# Patient Record
Sex: MALE | Race: WHITE
[De-identification: names, ages, dates, MRNs, and addresses within clinical notes are randomized per-mention and may not be internally consistent; named-entity substitution may affect disease eponyms.]

---

## 2020-02-17 ENCOUNTER — HOSPITAL ENCOUNTER (EMERGENCY)
Dept: HOSPITAL 25 - ED | Age: 65
Discharge: HOME | End: 2020-02-17
Payer: COMMERCIAL

## 2020-02-17 ENCOUNTER — HOSPITAL ENCOUNTER (EMERGENCY)
Dept: HOSPITAL 25 - UCEAST | Age: 65
Discharge: HOME HEALTH | End: 2020-02-17
Payer: COMMERCIAL

## 2020-02-17 VITALS — DIASTOLIC BLOOD PRESSURE: 83 MMHG | SYSTOLIC BLOOD PRESSURE: 172 MMHG

## 2020-02-17 VITALS — SYSTOLIC BLOOD PRESSURE: 169 MMHG | DIASTOLIC BLOOD PRESSURE: 85 MMHG

## 2020-02-17 DIAGNOSIS — C67.9: Primary | ICD-10-CM

## 2020-02-17 DIAGNOSIS — Z87.891: ICD-10-CM

## 2020-02-17 DIAGNOSIS — Z95.5: ICD-10-CM

## 2020-02-17 DIAGNOSIS — H61.23: ICD-10-CM

## 2020-02-17 DIAGNOSIS — R31.0: Primary | ICD-10-CM

## 2020-02-17 LAB
ALBUMIN SERPL BCG-MCNC: 4.8 G/DL (ref 3.2–5.2)
ALBUMIN/GLOB SERPL: 1.5 {RATIO} (ref 1–3)
ALP SERPL-CCNC: 35 U/L (ref 34–104)
ALT SERPL W P-5'-P-CCNC: 12 U/L (ref 7–52)
ANION GAP SERPL CALC-SCNC: 9 MMOL/L (ref 2–11)
AST SERPL-CCNC: 16 U/L (ref 13–39)
BASOPHILS # BLD AUTO: 0.1 10^3/UL (ref 0–0.2)
BUN SERPL-MCNC: 23 MG/DL (ref 6–24)
BUN/CREAT SERPL: 13.7 (ref 8–20)
CALCIUM SERPL-MCNC: 7.7 MG/DL (ref 8.6–10.3)
CHLORIDE SERPL-SCNC: 101 MMOL/L (ref 101–111)
EOSINOPHIL # BLD AUTO: 0.2 10^3/UL (ref 0–0.6)
GLOBULIN SER CALC-MCNC: 3.2 G/DL (ref 2–4)
GLUCOSE SERPL-MCNC: 93 MG/DL (ref 70–100)
HCO3 SERPL-SCNC: 27 MMOL/L (ref 22–32)
HCT VFR BLD AUTO: 39 % (ref 42–52)
HGB BLD-MCNC: 13.6 G/DL (ref 14–18)
LYMPHOCYTES # BLD AUTO: 1.6 10^3/UL (ref 1–4.8)
MCH RBC QN AUTO: 30 PG (ref 27–31)
MCHC RBC AUTO-ENTMCNC: 35 G/DL (ref 31–36)
MCV RBC AUTO: 86 FL (ref 80–94)
MONOCYTES # BLD AUTO: 0.6 10^3/UL (ref 0–0.8)
NEUTROPHILS # BLD AUTO: 5.3 10^3/UL (ref 1.5–7.7)
NRBC # BLD AUTO: 0 10^3/UL
NRBC BLD QL AUTO: 0
PLATELET # BLD AUTO: 311 10^3/UL (ref 150–450)
POTASSIUM SERPL-SCNC: 3.7 MMOL/L (ref 3.5–5)
PROT SERPL-MCNC: 8 G/DL (ref 6.4–8.9)
RBC # BLD AUTO: 4.5 10^6 /UL (ref 4.18–5.48)
RBC UR QL AUTO: (no result)
SODIUM SERPL-SCNC: 137 MMOL/L (ref 135–145)
WBC # BLD AUTO: 7.8 10^3/UL (ref 3.5–10.8)
WBC UR QL AUTO: (no result)

## 2020-02-17 PROCEDURE — 81015 MICROSCOPIC EXAM OF URINE: CPT

## 2020-02-17 PROCEDURE — 74178 CT ABD&PLV WO CNTR FLWD CNTR: CPT

## 2020-02-17 PROCEDURE — 81003 URINALYSIS AUTO W/O SCOPE: CPT

## 2020-02-17 PROCEDURE — 85025 COMPLETE CBC W/AUTO DIFF WBC: CPT

## 2020-02-17 PROCEDURE — 87086 URINE CULTURE/COLONY COUNT: CPT

## 2020-02-17 PROCEDURE — 99203 OFFICE O/P NEW LOW 30 MIN: CPT

## 2020-02-17 PROCEDURE — 80053 COMPREHEN METABOLIC PANEL: CPT

## 2020-02-17 PROCEDURE — 99283 EMERGENCY DEPT VISIT LOW MDM: CPT

## 2020-02-17 PROCEDURE — 76377 3D RENDER W/INTRP POSTPROCES: CPT

## 2020-02-17 PROCEDURE — G0463 HOSPITAL OUTPT CLINIC VISIT: HCPCS

## 2020-02-17 PROCEDURE — 36415 COLL VENOUS BLD VENIPUNCTURE: CPT

## 2020-02-17 NOTE — ED
GI/ HPI





- HPI Summary


HPI Summary: 


64-year-old male presents with intermittent hematuria for the past month.  He 

denies any pain.  No flank pain.  No dysuria.  He denies any urgency frequency 

or hesitancy.  He states occasionally he passes clots.  He states he had normal 

urine a couple days ago but today it is bright red.  He was a former smoker for 

40 years.  He does not currently have a doctor.  He has no medical conditions.  

he is not on blood thinners.  No history kidney stones. 





- History of Current Complaint


Chief Complaint: EDUrogenitalProblems


Time Seen by Provider: 02/17/20 20:38


Stated Complaint: URINATING BLOOD PER PT


Pain Intensity: 0





- Allergy/Home Medications


Allergies/Adverse Reactions: 


 Allergies











Allergy/AdvReac Type Severity Reaction Status Date / Time


 


No Known Allergies Allergy   Verified 02/17/20 20:28














PMH/Surg Hx/FS Hx/Imm Hx


Endocrine/Hematology History: 


   Denies: Hx Diabetes


Cardiovascular History: 


   Denies: Hx Hypertension, Hx Pacemaker/ICD


 History: 


   Denies: Hx Renal Disease


Sensory History: 


   Denies: Hx Hearing Aid


Psychiatric History: 


   Denies: Hx Panic Disorder





- Surgical History


Surgery Procedure, Year, and Place: 02/10 - CARDIAC STENTS - PT HAS CARDS - 

WILL BRING AT TIME OF APT - MULTI LINK MINI VISION -CONDITIONAL 5 FOR UP TO 3T.

  05/10 - Rt ANKLE -PLATES/SCREWS -





- Immunization History


Immunizations Up to Date: No


Infectious Disease History: No


Infectious Disease History: 


   Denies: Traveled Outside the [place] in Last 30 Days





- Family History


Known Family History: Positive: Non-Contributory





- Social History


Alcohol Use: None


Substance Use Type: Reports: None


Smoking Status (MU): Former Smoker





Review of Systems


Negative: Fever


Negative: Chest Pain


Negative: Shortness Of Breath


Positive: hematuria.  Negative: flank pain


All Other Systems Reviewed And Are Negative: Yes





Physical Exam


Triage Information Reviewed: Yes


Vital Signs On Initial Exam: 


 Initial Vitals











Temp Pulse Resp BP Pulse Ox


 


 97.5 F   60   15   174/86   100 


 


 02/17/20 20:26  02/17/20 20:26  02/17/20 20:26  02/17/20 20:26  02/17/20 20:26











Vital Signs Reviewed: Yes


Appearance: Positive: Well-Appearing


Skin: Positive: Warm, Dry


Head/Face: Positive: Normal Head/Face Inspection


Eyes: Positive: Normal, Conjunctiva Clear


ENT: Positive: Pharynx normal


Respiratory/Lung Sounds: Positive: Clear to Auscultation, Breath Sounds Present


Cardiovascular: Positive: Normal, RRR


Abdomen Description: Positive: Nontender, Soft.  Negative: CVA Tenderness (R), 

CVA Tenderness (L)


Bowel Sounds: Positive: Present


Musculoskeletal: Positive: Normal


Neurological: Positive: Normal


Psychiatric: Positive: Normal





Procedures





- Sedation


Patient Received Moderate/Deep Sedation with Procedure: No





Diagnostics





- Vital Signs


 Vital Signs











  Temp Pulse Resp BP Pulse Ox


 


 02/17/20 20:26  97.5 F  60  15  174/86  100














- Laboratory


Lab Results: 


 Lab Results











  02/17/20 02/17/20 02/17/20 Range/Units





  20:45 20:49 20:49 


 


WBC   7.8   (3.5-10.8)  10^3/uL


 


RBC   4.50   (4.18-5.48)  10^6 /uL


 


Hgb   13.6 L   (14.0-18.0)  g/dL


 


Hct   39 L   (42-52)  %


 


MCV   86   (80-94)  fL


 


MCH   30   (27-31)  pg


 


MCHC   35   (31-36)  g/dL


 


RDW   14   (10-15)  %


 


Plt Count   311   (150-450)  10^3/uL


 


MPV   6.9 L   (7.4-10.4)  fL


 


Neut % (Auto)   68.7   %


 


Lymph % (Auto)   21.0   %


 


Mono % (Auto)   7.3   %


 


Eos % (Auto)   2.0   %


 


Baso % (Auto)   1.0   %


 


Absolute Neuts (auto)   5.3   (1.5-7.7)  10^3/ul


 


Absolute Lymphs (auto)   1.6   (1.0-4.8)  10^3/ul


 


Absolute Monos (auto)   0.6   (0-0.8)  10^3/ul


 


Absolute Eos (auto)   0.2   (0-0.6)  10^3/ul


 


Absolute Basos (auto)   0.1   (0-0.2)  10^3/ul


 


Absolute Nucleated RBC   0.0   10^3/ul


 


Nucleated RBC %   0.0   


 


Sodium    137  (135-145)  mmol/L


 


Potassium    3.7  (3.5-5.0)  mmol/L


 


Chloride    101  (101-111)  mmol/L


 


Carbon Dioxide    27  (22-32)  mmol/L


 


Anion Gap    9  (2-11)  mmol/L


 


BUN    23  (6-24)  mg/dL


 


Creatinine    1.68 H  (0.67-1.17)  mg/dL


 


Est GFR ( Amer)    50.0  (>60)  


 


Est GFR (Non-Af Amer)    41.3  (>60)  


 


BUN/Creatinine Ratio    13.7  (8-20)  


 


Glucose    93  ()  mg/dL


 


Calcium    7.7 L  (8.6-10.3)  mg/dL


 


Total Bilirubin    0.40  (0.2-1.0)  mg/dL


 


AST    16  (13-39)  U/L


 


ALT    12  (7-52)  U/L


 


Alkaline Phosphatase    35  ()  U/L


 


Total Protein    8.0  (6.4-8.9)  g/dL


 


Albumin    4.8  (3.2-5.2)  g/dL


 


Globulin    3.2  (2-4)  g/dL


 


Albumin/Globulin Ratio    1.5  (1-3)  


 


Urine Color  Red A    


 


Urine Appearance  Cloudy    


 


Urine pH  Np    


 


Ur Specific Gravity  1.009 L    (1.010-1.030)  


 


Urine Protein  Np    


 


Urine Ketones  Np    


 


Urine Blood  Np    


 


Urine Nitrate  Np    


 


Urine Bilirubin  Np    


 


Urine Urobilinogen  Np    


 


Ur Leukocyte Esterase  Np    


 


Urine WBC (Auto)  Trace(0-5/hpf)    (Absent)  


 


Urine RBC (Auto)  3+(>10/hpf) A    (Absent)  


 


Urine Bacteria  Absent    (Absent)  


 


Urine Glucose  Np    











Result Diagrams: 


 02/17/20 20:49





 02/17/20 20:49


Lab Statement: Any lab studies that have been ordered have been reviewed, and 

results considered in the medical decision making process.





- Ultrasound


  ** No standard instances


Ultrasound Interpretation Completed By: Radiologist


Summary of Ultrasound Findings: IMPRESSION: Urothelial carcinoma posterior left 

bladder obstructing the left UVJ. No clear extra bladder extension (T3 A./B.). 

No abnormal lymph nodes (N0). No distal abdominal pelvic metastases (MX). 

Recommend cystoscopy and biopsy for further characterization along with PET/CT 

for full staging.





GIGU Course/Dx





- Course


Course Of Treatment: 64-year-old male presents with intermittent hematuria for 

the past month.  He denies any pain.  No flank pain.  No dysuria.  He denies 

any urgency frequency or hesitancy.  He states occasionally he passes clots.  

He states he had normal urine a couple days ago but today it is bright red.  He 

was a former smoker for 40 years.  He does not currently have a doctor.  He has 

no medical conditions.  he is not on blood thinners.  No history kidney stones.

  On exam nontender abdomen and flank.  Urine shows hematuria.  wbc normal.  

Creatinine 1.68.  CT urogram shows carcinoma left bladder. spoke with dr gale 

who says can follow up in office. patient understand and agrees with plan.





- Diagnoses


Differential Diagnoses - Male: Ureteral Calculi, Urinary Tract Infection


Provider Diagnoses: 


 Bladder cancer








Discharge ED





- Sign-Out/Discharge


Documenting (check all that apply): Patient Departure





- Discharge Plan


Condition: Good


Disposition: HOME


Patient Education Materials:  Bladder Cancer (DC)


Referrals: 


No Primary Care Phys,NOPCP [Primary Care Provider] - 


Bruce Gale MD [Medical Doctor] - 


Additional Instructions: 


call urology tomorrow for follow up


Return to ED if develop any new or worsening symptoms





- Billing Disposition and Condition


Condition: GOOD


Disposition: Home

## 2020-02-17 NOTE — UC
Complaint Male HPI





- HPI Summary


HPI Summary: 





The patient is a 64-year-old male who has had intermittent painless gross 

hematuria times one month.  He has had no fever or chills he denies any 

unintentional weight loss.  He has no painful urination or flank pain.  He 

complains that he has not been able to here for the past few days.  He denies 

any headache.





- History of Current Complaint


Chief Complaint: UCGU


Stated Complaint: PROBLEM WITH URINATION


Time Seen by Provider: 02/17/20 19:25


Hx Obtained From: Patient


Onset/Duration: Gradual Onset, Lasting Weeks


Timing: Intermittent, Lasting Seconds


Severity Initially: Mild


Severity Currently: Moderate


Pain Intensity: 0


Pain Scale Used: 0-10 Numeric


Location: None


Associated Signs And Symptoms: Positive: Hematuria





- Allergies/Home Medications


Allergies/Adverse Reactions: 


 Allergies











Allergy/AdvReac Type Severity Reaction Status Date / Time


 


No Known Allergies Allergy   Verified 02/17/20 19:21











Home Medications: 


 Home Medications





Pseudoephedrine TAB* [Sudafed TAB*] 60 mg PO BID 02/17/20 [History Confirmed 02/ 17/20]











PMH/Surg Hx/FS Hx/Imm Hx


Previously Healthy: Yes


Cardiovascular History: Cardiac Disease





- Surgical History


Surgical History: Yes


Surgery Procedure, Year, and Place: 02/10 - CARDIAC STENTS - PT HAS CARDS - 

WILL BRING AT TIME OF APT - MULTI LINK MINI VISION -CONDITIONAL 5 FOR UP TO 3T.

  05/10 - Rt ANKLE -PLATES/SCREWS -





- Social History


Alcohol Use: None


Substance Use Type: None


Smoking Status (MU): Former Smoker





Review of Systems


All Other Systems Reviewed And Are Negative: Yes


Constitutional: Positive: Negative


Skin: Positive: Negative


Eyes: Positive: Negative


ENT: Positive: Other - decreased hearing


Respiratory: Positive: Negative


Cardiovascular: Positive: Negative


Gastrointestinal: Positive: Negative


Genitourinary: Positive: Hematuria


Motor: Positive: Negative


Neurovascular: Positive: Negative


Musculoskeletal: Positive: Negative


Neurological/Mental Status: Positive: Negative


Psychological: Positive: Negative





Physical Exam


Triage Information Reviewed: Yes


Appearance: Well-Appearing, No Pain Distress, Well-Nourished


Vital Signs: 


 Initial Vital Signs











Temp  98.4 F   02/17/20 19:15


 


Pulse  72   02/17/20 19:15


 


Resp  16   02/17/20 19:15


 


BP  172/83   02/17/20 19:15


 


Pulse Ox  99   02/17/20 19:15











Vital Signs Reviewed: Yes


Eyes: Positive: Conjunctiva Clear


ENT: Positive: Uvula midline.  Negative: Hearing grossly normal, Pharyngeal 

erythema, Nasal congestion, Tonsillar swelling, Tonsillar exudate, Trismus, 

Muffled voice, Hoarse voice


Dental: Negative: Gross Decay/Caries @


Neck: Positive: Supple, Nontender


Respiratory: Positive: Lungs clear, Normal breath sounds, No respiratory 

distress, No accessory muscle use


Cardiovascular: Positive: RRR, No Murmur


Abdomen Description: Positive: Nontender, No Organomegaly - In, Soft.  Negative

: CVA Tenderness (R), CVA Tenderness (L)


Bowel Sounds: Positive: Present


Neurological: Positive: Alert


Psychological Exam: Normal


Skin Exam: Normal





Diagnostics





- Laboratory


Lab Results: 





urine: gross hematuria





 Complaint Male Course/Dx





- Course


Course Of Treatment: 





I informed the patient at the primary concern as a cause for his hematuria is 

cancer.  I informed him that we could not do the evaluation here.  He states he 

does not have a primary care physician so I have suggested he go to the 

emergency room for evaluation.  While he is here we will flush his ears.





- Differential Dx/Diagnosis


Provider Diagnosis: 


 Gross hematuria, Cerumen impaction








Discharge ED





- Sign-Out/Discharge


Documenting (check all that apply): Patient Departure


All imaging exams completed and their final reports reviewed: No Studies





- Discharge Plan


Condition: Stable


Disposition: HOME-RECOMMEND TO ED


Patient Education Materials:  Hematuria (ED)


Additional Instructions: 


I suggest you go to the ER to have your GROSS HEMATURIA evaluated





Since you don't have a primary care provider to do an expedited out patient-

evaluation I suspect this work up would be best started in the ER 








- Billing Disposition and Condition


Condition: STABLE


Disposition: Home-Recommend to ED

## 2020-03-18 NOTE — HP
HISTORY AND PHYSICAL:

 

DATE OF PLANNED ADMISSION AND SURGERY:  03/20/20

 

HISTORY OF PRESENT ILLNESS:  Mr. Bird is a 64-year-old white male who is 
admitted with a large bladder tumor and left ureteral obstruction for cystoscopy
, transurethral resection of bladder tumor, left retrograde pyelography, and 
insertion of a left ureteral stent.

 

Please refer to the detailed history and physical by Dr. Ceron dated 03/13/20.

 

Mr. Bird is a 64-year-old white male who presented to the emergency room about 
1 month ago because of gross painless hematuria.  He had a CT urogram in the 
emergency room which showed moderate left hydroureteronephrosis and a rather 
large mass occupying the left base and the left lateral  bladder wall.  There 
was no evidence of extension of the tumor outside the bladder wall and no 
abnormal filling defects in the ureters or in the collecting systems.  He did 
not have any lymphadenopathy or evidence of metastatic disease.

 

The patient had a cystoscopy in the office which confirmed the presence of a 
high- grade looking transitional cell carcinoma occupying the left base of the 
bladder, the left trigone, and the left lateral wall.  The left ureteral 
orifice could not be visualized due to the overlying tumor.  Evaluation of the 
rest of the bladder wall was suboptimal due to the presence of blood in the 
bladder.

 

Past  history is otherwise negative.  He has nocturia once or twice, the 
frequency every 2 hours.  He reports having adequate urinary stream and no 
feeling of incomplete bladder emptying.  He denies any past history of urinary 
tract infections or gross hematuria.

 

PAST MEDICAL HISTORY AND SYSTEM REVIEW:  Summarized in Dr. Ceron's note. The 
patient has history of an old myocardial infarction, but has been asymptomatic 
cardiac wise.  He was worked up by Dr. Ceron including a nuclear stress test, 
which showed low risk disease.

 

CURRENT MEDICATIONS:

1.  Amlodipine 5 mg daily.

2.  Atorvastatin 80 mg daily.

 

ALLERGIES:  He denies any allergies to medications.

 

FAMILY HISTORY:  Negative.

 

PERSONAL HISTORY:  The patient was a smoker of 2 packs per day for almost 30 
years and stopped 10 years ago.  He denies shortness of breath or difficulty 
breathing. He denies any alcohol or drug use.  He denies any mental health 
history.

 

                               PHYSICAL EXAMINATION

 

GENERAL:  Pleasant white male, who looks older than his age.

 

VITAL SIGNS:  Blood pressure 150/70, pulse of 60.

 

LUNGS:  Clear.

 

HEART:  Regular and rhythmic.  No murmurs.

 

ABDOMEN:  Soft.  No masses, no tenderness, and no CVA tenderness.

 

EXTERNAL GENITALIA:  Normal.

 

RECTAL EXAM:  Shows a moderately enlarged, but non-suspicious prostate.

 

 IMPRESSION:

1.  High-grade looking bulky transitional cell carcinoma involving the left 
base and the left lateral bladder walls with associated left 
hydroureteronephrosis strongly suggestive of muscle invasive disease.

2. Except for the left hydronephrosis, the upper tracts look normal on the CT 
urogram, in particular no abnormal filling defects in the collecting systems or 
the ureters.

3.  Coronary artery disease, status post stent insertion 10 years ago.  
Cardiology consultation and clearance obtained.

 

PLAN:  Plan is for cystoscopy, transurethral resection of the bladder tumor, 
and insertion of left ureteral stent.

 

The patient understands he will require additional treatment depending upon the 
pathology. Some of the potential complications of the procedure include 
infection, hematuria, possible bladder perforation, and possible need for a 
left nephrostomy tube.

 

All his questions were answered.

 

 

 

[id number]/CPS #: [id number]

GREGOR

## 2020-03-20 ENCOUNTER — HOSPITAL ENCOUNTER (OUTPATIENT)
Dept: HOSPITAL 25 - OR | Age: 65
Discharge: HOME | End: 2020-03-20
Attending: UROLOGY
Payer: COMMERCIAL

## 2020-03-20 VITALS — DIASTOLIC BLOOD PRESSURE: 79 MMHG | SYSTOLIC BLOOD PRESSURE: 150 MMHG

## 2020-03-20 DIAGNOSIS — J44.9: ICD-10-CM

## 2020-03-20 DIAGNOSIS — N40.0: ICD-10-CM

## 2020-03-20 DIAGNOSIS — N13.39: ICD-10-CM

## 2020-03-20 DIAGNOSIS — C67.8: Primary | ICD-10-CM

## 2020-03-20 DIAGNOSIS — E78.5: ICD-10-CM

## 2020-03-20 DIAGNOSIS — Z87.891: ICD-10-CM

## 2020-03-20 DIAGNOSIS — Z95.5: ICD-10-CM

## 2020-03-20 DIAGNOSIS — I25.10: ICD-10-CM

## 2020-03-20 DIAGNOSIS — I25.2: ICD-10-CM

## 2020-03-20 PROCEDURE — 88341 IMHCHEM/IMCYTCHM EA ADD ANTB: CPT

## 2020-03-20 PROCEDURE — 88360 TUMOR IMMUNOHISTOCHEM/MANUAL: CPT

## 2020-03-20 PROCEDURE — 74018 RADEX ABDOMEN 1 VIEW: CPT

## 2020-03-20 PROCEDURE — 88342 IMHCHEM/IMCYTCHM 1ST ANTB: CPT

## 2020-03-20 PROCEDURE — 88307 TISSUE EXAM BY PATHOLOGIST: CPT

## 2020-03-20 NOTE — OP
OPERATIVE REPORT:

 

DATE OF OPERATION:  03/20/20

 

DATE OF BIRTH:  [birth year]

 

SURGEON:  Julian Limon MD

 

ANESTHESIOLOGIST:  Dr. Vasiliy Obrien.

 

ANESTHESIA:  General.

 

PRE-OP DIAGNOSES:

1.  Bladder tumor, left bladder wall.

2.  Left hydroureteronephrosis due to above.

 

POST-OP DIAGNOSES:

1.  Bladder tumor, left bladder wall.

2.  Left hydroureteronephrosis due to above.

 

OPERATIVE PROCEDURE:

1.  Cystoscopy.

2.  Transurethral resection of bladder tumor (left base, left lateral, and left 
anterolateral bladder wall).

3.  Attempted left retrograde pyelography.

 

INDICATION FOR PROCEDURE:  Mr. Bird is a 64-year-old white male who gives past 
history of heavy chronic smoking and who presented to the emergency room about 
1 month ago with total gross painless hematuria.  CT urogram showed moderate 
left hydronephrosis and hydroureter and a large mass occupying the left wall of 
the bladder.  Office cystoscopy showed a high-grade looking bulky tumor 
occupying the left bladder wall.  The patient is admitted for the above 
procedure.

 

PATHOLOGY:  At cystoscopy, the penile and bulbar urethrae looked normal.  The 
prostatic urethra measured 2.5 cm in length and there was only mild obstruction 
by prostate enlargement.

 

Examination of the bladder showed a large tumor starting at the level of the 
left trigone, covering the left ureteral orifice, and extending laterally and 
anterolaterally to about 1 o'clock.  The tumor was extending to just before the 
bladder neck.  The tumor looked solid and invasive. It was only minimally 
vascular.  The left ureteral orifice could not be identified in spite of 
prolonged inspection, and in spite of giving the patient IV fluorescein.

 

DESCRIPTION OF PROCEDURE:  After successful general endotracheal anesthesia, 
the patient was placed in the lithotomy position and was prepped and draped for 
a cystoscopy.  Cystoscopy was performed and the bladder was carefully inspected 
and the above findings were noted.  The left ureteral orifice could not be 
identified. The patient was given 0.5 cc of fluorescein.  There was good efflux 
from the right orifice, but none was noted in the area of the left trigone.

 

The cystoscope was then removed and the resectoscope was introduced inside the 
bladder.  The bladder tumor was then resected deep into muscle.  The bleeders 
were electrocoagulated.  All the visible bladder tumor was resected and the 
specimen sent for pathology.  Good hemostasis was achieved.  There was no 
evidence of bladder perforation, although in 1 area at about 5 o'clock, there 
was some fat noted at depth of the resection.  There were, however, no venous 
sinuses open and there was good hemostasis achieved.

 

The cystoscope was then reintroduced inside the bladder.  A fair amount of time 
was spent trying to identify the left ureteral orifice.  In spite of careful 
inspection, IV fluorescein, and checking for possible peristalsis at the left 
orifice, the left orifice could not be identified and could not be intubated 
for retrograde pyelography and stent placement.

 

The resectoscope was then removed.  A size 20-Malay Davis catheter was passed 
inside the bladder.  Irrigation yielded clear returns.

 

Bimanual rectal examination was then performed.  The prostate felt normal.  I 
could not feel any masses in the area of the posterior bladder wall.

 

The patient tolerated the procedure well and left the operating room in good 
condition.  The blood loss was estimated at about 50 cc or less.  The specimen 
was bladder tumor.

 

The plan is to discharge the patient home with a Davis catheter.  He will be 
seen in the office in 3 days and the Davis will be removed.  CT of the abdomen 
and pelvis to check on the left hydronephrosis. If the hydronephrosis is still 
persistent or worse, then the patient will need left nephrostomy tube insertion.

 

 [id number]/CPS #: [id number]

GREGOR